# Patient Record
Sex: FEMALE | Race: WHITE | NOT HISPANIC OR LATINO | URBAN - METROPOLITAN AREA
[De-identification: names, ages, dates, MRNs, and addresses within clinical notes are randomized per-mention and may not be internally consistent; named-entity substitution may affect disease eponyms.]

---

## 2022-01-13 ENCOUNTER — EMERGENCY (EMERGENCY)
Facility: HOSPITAL | Age: 37
LOS: 1 days | Discharge: HOME | End: 2022-01-13
Attending: EMERGENCY MEDICINE | Admitting: EMERGENCY MEDICINE
Payer: COMMERCIAL

## 2022-01-13 VITALS
HEART RATE: 90 BPM | DIASTOLIC BLOOD PRESSURE: 65 MMHG | OXYGEN SATURATION: 99 % | HEIGHT: 64 IN | WEIGHT: 158.73 LBS | SYSTOLIC BLOOD PRESSURE: 101 MMHG | RESPIRATION RATE: 16 BRPM | TEMPERATURE: 99 F

## 2022-01-13 VITALS
TEMPERATURE: 98 F | SYSTOLIC BLOOD PRESSURE: 100 MMHG | RESPIRATION RATE: 15 BRPM | DIASTOLIC BLOOD PRESSURE: 68 MMHG | HEART RATE: 85 BPM | OXYGEN SATURATION: 98 %

## 2022-01-13 LAB
ALBUMIN SERPL ELPH-MCNC: 3.2 G/DL — LOW (ref 3.3–5)
ALP SERPL-CCNC: 67 U/L — SIGNIFICANT CHANGE UP (ref 40–120)
ALT FLD-CCNC: 25 U/L — SIGNIFICANT CHANGE UP (ref 12–78)
ANION GAP SERPL CALC-SCNC: 7 MMOL/L — SIGNIFICANT CHANGE UP (ref 5–17)
AST SERPL-CCNC: 22 U/L — SIGNIFICANT CHANGE UP (ref 15–37)
BASOPHILS # BLD AUTO: 0.01 K/UL — SIGNIFICANT CHANGE UP (ref 0–0.2)
BASOPHILS NFR BLD AUTO: 0.2 % — SIGNIFICANT CHANGE UP (ref 0–2)
BILIRUB SERPL-MCNC: 0.4 MG/DL — SIGNIFICANT CHANGE UP (ref 0.2–1.2)
BUN SERPL-MCNC: 4 MG/DL — LOW (ref 7–23)
CALCIUM SERPL-MCNC: 8.8 MG/DL — SIGNIFICANT CHANGE UP (ref 8.5–10.1)
CHLORIDE SERPL-SCNC: 106 MMOL/L — SIGNIFICANT CHANGE UP (ref 96–108)
CO2 SERPL-SCNC: 23 MMOL/L — SIGNIFICANT CHANGE UP (ref 22–31)
CREAT SERPL-MCNC: 0.55 MG/DL — SIGNIFICANT CHANGE UP (ref 0.5–1.3)
EOSINOPHIL # BLD AUTO: 0.04 K/UL — SIGNIFICANT CHANGE UP (ref 0–0.5)
EOSINOPHIL NFR BLD AUTO: 1 % — SIGNIFICANT CHANGE UP (ref 0–6)
GLUCOSE SERPL-MCNC: 116 MG/DL — HIGH (ref 70–99)
HCG SERPL-ACNC: HIGH MIU/ML
HCT VFR BLD CALC: 37.9 % — SIGNIFICANT CHANGE UP (ref 34.5–45)
HGB BLD-MCNC: 12.5 G/DL — SIGNIFICANT CHANGE UP (ref 11.5–15.5)
IMM GRANULOCYTES NFR BLD AUTO: 0.7 % — SIGNIFICANT CHANGE UP (ref 0–1.5)
LIDOCAIN IGE QN: 78 U/L — SIGNIFICANT CHANGE UP (ref 73–393)
LYMPHOCYTES # BLD AUTO: 1.09 K/UL — SIGNIFICANT CHANGE UP (ref 1–3.3)
LYMPHOCYTES # BLD AUTO: 27.2 % — SIGNIFICANT CHANGE UP (ref 13–44)
MCHC RBC-ENTMCNC: 25.5 PG — LOW (ref 27–34)
MCHC RBC-ENTMCNC: 33 GM/DL — SIGNIFICANT CHANGE UP (ref 32–36)
MCV RBC AUTO: 77.3 FL — LOW (ref 80–100)
MONOCYTES # BLD AUTO: 0.5 K/UL — SIGNIFICANT CHANGE UP (ref 0–0.9)
MONOCYTES NFR BLD AUTO: 12.5 % — SIGNIFICANT CHANGE UP (ref 2–14)
NEUTROPHILS # BLD AUTO: 2.34 K/UL — SIGNIFICANT CHANGE UP (ref 1.8–7.4)
NEUTROPHILS NFR BLD AUTO: 58.4 % — SIGNIFICANT CHANGE UP (ref 43–77)
NRBC # BLD: 0 /100 WBCS — SIGNIFICANT CHANGE UP (ref 0–0)
PLATELET # BLD AUTO: 221 K/UL — SIGNIFICANT CHANGE UP (ref 150–400)
POTASSIUM SERPL-MCNC: 3.3 MMOL/L — LOW (ref 3.5–5.3)
POTASSIUM SERPL-SCNC: 3.3 MMOL/L — LOW (ref 3.5–5.3)
PROT SERPL-MCNC: 7.5 G/DL — SIGNIFICANT CHANGE UP (ref 6–8.3)
RBC # BLD: 4.9 M/UL — SIGNIFICANT CHANGE UP (ref 3.8–5.2)
RBC # FLD: 17 % — HIGH (ref 10.3–14.5)
SODIUM SERPL-SCNC: 136 MMOL/L — SIGNIFICANT CHANGE UP (ref 135–145)
WBC # BLD: 4.01 K/UL — SIGNIFICANT CHANGE UP (ref 3.8–10.5)
WBC # FLD AUTO: 4.01 K/UL — SIGNIFICANT CHANGE UP (ref 3.8–10.5)

## 2022-01-13 PROCEDURE — 76705 ECHO EXAM OF ABDOMEN: CPT | Mod: 26,RT

## 2022-01-13 PROCEDURE — 96361 HYDRATE IV INFUSION ADD-ON: CPT

## 2022-01-13 PROCEDURE — 76801 OB US < 14 WKS SINGLE FETUS: CPT

## 2022-01-13 PROCEDURE — 76801 OB US < 14 WKS SINGLE FETUS: CPT | Mod: 26

## 2022-01-13 PROCEDURE — 99285 EMERGENCY DEPT VISIT HI MDM: CPT

## 2022-01-13 PROCEDURE — 80053 COMPREHEN METABOLIC PANEL: CPT

## 2022-01-13 PROCEDURE — 83690 ASSAY OF LIPASE: CPT

## 2022-01-13 PROCEDURE — 76705 ECHO EXAM OF ABDOMEN: CPT

## 2022-01-13 PROCEDURE — 96374 THER/PROPH/DIAG INJ IV PUSH: CPT

## 2022-01-13 PROCEDURE — 85025 COMPLETE CBC W/AUTO DIFF WBC: CPT

## 2022-01-13 PROCEDURE — 36415 COLL VENOUS BLD VENIPUNCTURE: CPT

## 2022-01-13 PROCEDURE — 99284 EMERGENCY DEPT VISIT MOD MDM: CPT | Mod: 25

## 2022-01-13 PROCEDURE — 84702 CHORIONIC GONADOTROPIN TEST: CPT

## 2022-01-13 RX ORDER — SODIUM CHLORIDE 9 MG/ML
1000 INJECTION INTRAMUSCULAR; INTRAVENOUS; SUBCUTANEOUS ONCE
Refills: 0 | Status: COMPLETED | OUTPATIENT
Start: 2022-01-13 | End: 2022-01-13

## 2022-01-13 RX ORDER — PANTOPRAZOLE SODIUM 20 MG/1
1 TABLET, DELAYED RELEASE ORAL
Qty: 14 | Refills: 0
Start: 2022-01-13 | End: 2022-01-26

## 2022-01-13 RX ORDER — PANTOPRAZOLE SODIUM 20 MG/1
40 TABLET, DELAYED RELEASE ORAL ONCE
Refills: 0 | Status: COMPLETED | OUTPATIENT
Start: 2022-01-13 | End: 2022-01-13

## 2022-01-13 RX ADMIN — SODIUM CHLORIDE 1000 MILLILITER(S): 9 INJECTION INTRAMUSCULAR; INTRAVENOUS; SUBCUTANEOUS at 11:10

## 2022-01-13 RX ADMIN — PANTOPRAZOLE SODIUM 40 MILLIGRAM(S): 20 TABLET, DELAYED RELEASE ORAL at 10:09

## 2022-01-13 RX ADMIN — SODIUM CHLORIDE 1000 MILLILITER(S): 9 INJECTION INTRAMUSCULAR; INTRAVENOUS; SUBCUTANEOUS at 10:09

## 2022-01-13 NOTE — ED PROVIDER NOTE - NSFOLLOWUPINSTRUCTIONS_ED_ALL_ED_FT
1) Follow-up with your Primary Medical Doctor. Call today for prompt follow-up.  2) Return to Emergency room for any worsening or persistent pain, weakness, fever, vomiting, diarrhea, unable to eat / drink, weak or dizzy, or any other concerning symptoms.  3) See attached instruction sheets for additional information, including information regarding signs and symptoms to look out for, reasons to seek immediate care and other important instructions.  4) Follow-up with gastroenterology - call today to arrange prompt follow-up  5) Protonix once daily for 14 days  6) Follow-up with Dr Trina Sapp , your OBGYN as discussed

## 2022-01-13 NOTE — ED ADULT TRIAGE NOTE - CHIEF COMPLAINT QUOTE
upper abdominal pain for 3 weeks, pt is approx 11 weeks pregnant (LMP 10/31/21), was given pepcid 20mg from PMD with no relief

## 2022-01-13 NOTE — ED PROVIDER NOTE - GASTROINTESTINAL, MLM
Abdomen soft, mild epigastric tenderness, no lower abd tenderness. no guarding. non-distended. no hsm. no cvat

## 2022-01-13 NOTE — ED PROVIDER NOTE - CARE PROVIDER_API CALL
FINESSE COSTA  [facility]  Phone: [phone]  Fax: [phone]  Follow Up Time:     Vickey Henao (DO)  Gastroenterology; Internal Medicine  [address] [place] [address]  Phone: [phone]  Fax: [phone]  Follow Up Time:

## 2022-01-13 NOTE — ED ADULT NURSE NOTE - OBJECTIVE STATEMENT
Presents to ER with epigastric pain for the past couple of weeks.  Is 11 weeks pregnant (LMP 10.31) and was placed on famotidine which she reports no relief.  States pain worsens after meals or when she drinks acidic drinks like orange juice.

## 2022-01-13 NOTE — ED PROVIDER NOTE - PATIENT PORTAL LINK FT
You can access the FollowMyHealth Patient Portal offered by [facility] by registering at the following website: [email or web address]. By joining [employer]’s FollowMyHealth portal, you will also be able to view your health information using other applications (apps) compatible with our system.

## 2022-01-13 NOTE — ED PROVIDER NOTE - OBJECTIVE STATEMENT
37 yo F  10wk 5/7 , edc 8/6 p/w epigastric pain x past few weeks. Pt was seen by PMD and was given pepcid. pt with persistent discomfort, stopped pepcid, some improvement with TUMS. No cp/sob/palp. no cough/ uri. no covid exposures. Pt not vaccinated. no neck / back pain. no numb/ting/focal weak. NO recent illness. No other abd pain. no vb / vag dc. no other acute co or changes.

## 2022-01-13 NOTE — ED PROVIDER NOTE - PROGRESS NOTE DETAILS
Pt doing well, no acute co or changes. Pt with some improvement. Dw pt re PPI and possible risks during preg, answered all questions and is comfortable with taking med. Pt will fu with GI asap for further villareal and eval. All results were explained to patient and a copy of all available results given.

## 2022-03-25 PROBLEM — Z00.00 ENCOUNTER FOR PREVENTIVE HEALTH EXAMINATION: Status: ACTIVE | Noted: 2022-03-25

## 2022-03-30 ENCOUNTER — APPOINTMENT (OUTPATIENT)
Dept: PEDIATRIC CARDIOLOGY | Facility: CLINIC | Age: 37
End: 2022-03-30

## 2022-04-02 ENCOUNTER — EMERGENCY (EMERGENCY)
Facility: HOSPITAL | Age: 37
LOS: 1 days | Discharge: HOME | End: 2022-04-02
Attending: STUDENT IN AN ORGANIZED HEALTH CARE EDUCATION/TRAINING PROGRAM | Admitting: STUDENT IN AN ORGANIZED HEALTH CARE EDUCATION/TRAINING PROGRAM
Payer: COMMERCIAL

## 2022-04-02 VITALS
OXYGEN SATURATION: 100 % | HEART RATE: 120 BPM | HEIGHT: 64 IN | DIASTOLIC BLOOD PRESSURE: 66 MMHG | SYSTOLIC BLOOD PRESSURE: 105 MMHG | TEMPERATURE: 99 F | RESPIRATION RATE: 18 BRPM

## 2022-04-02 VITALS
OXYGEN SATURATION: 100 % | DIASTOLIC BLOOD PRESSURE: 60 MMHG | HEART RATE: 96 BPM | SYSTOLIC BLOOD PRESSURE: 105 MMHG | RESPIRATION RATE: 18 BRPM

## 2022-04-02 LAB
ALBUMIN SERPL ELPH-MCNC: 3.6 G/DL — SIGNIFICANT CHANGE UP (ref 3.3–5)
ALP SERPL-CCNC: 77 U/L — SIGNIFICANT CHANGE UP (ref 40–120)
ALT FLD-CCNC: 17 U/L — SIGNIFICANT CHANGE UP (ref 4–33)
ANION GAP SERPL CALC-SCNC: 12 MMOL/L — SIGNIFICANT CHANGE UP (ref 7–14)
APPEARANCE UR: CLEAR — SIGNIFICANT CHANGE UP
APTT BLD: 28.6 SEC — SIGNIFICANT CHANGE UP (ref 27–36.3)
AST SERPL-CCNC: 22 U/L — SIGNIFICANT CHANGE UP (ref 4–32)
BASOPHILS # BLD AUTO: 0.02 K/UL — SIGNIFICANT CHANGE UP (ref 0–0.2)
BASOPHILS NFR BLD AUTO: 0.3 % — SIGNIFICANT CHANGE UP (ref 0–2)
BILIRUB SERPL-MCNC: <0.2 MG/DL — SIGNIFICANT CHANGE UP (ref 0.2–1.2)
BILIRUB UR-MCNC: NEGATIVE — SIGNIFICANT CHANGE UP
BUN SERPL-MCNC: 4 MG/DL — LOW (ref 7–23)
CALCIUM SERPL-MCNC: 8.8 MG/DL — SIGNIFICANT CHANGE UP (ref 8.4–10.5)
CHLORIDE SERPL-SCNC: 103 MMOL/L — SIGNIFICANT CHANGE UP (ref 98–107)
CO2 SERPL-SCNC: 17 MMOL/L — LOW (ref 22–31)
COLOR SPEC: COLORLESS — SIGNIFICANT CHANGE UP
CREAT SERPL-MCNC: 0.47 MG/DL — LOW (ref 0.5–1.3)
D DIMER BLD IA.RAPID-MCNC: 574 NG/ML DDU — HIGH
DIFF PNL FLD: NEGATIVE — SIGNIFICANT CHANGE UP
EGFR: 126 ML/MIN/1.73M2 — SIGNIFICANT CHANGE UP
EOSINOPHIL # BLD AUTO: 0.09 K/UL — SIGNIFICANT CHANGE UP (ref 0–0.5)
EOSINOPHIL NFR BLD AUTO: 1.3 % — SIGNIFICANT CHANGE UP (ref 0–6)
FLUAV AG NPH QL: SIGNIFICANT CHANGE UP
FLUBV AG NPH QL: SIGNIFICANT CHANGE UP
GLUCOSE SERPL-MCNC: 105 MG/DL — HIGH (ref 70–99)
GLUCOSE UR QL: NEGATIVE — SIGNIFICANT CHANGE UP
HCT VFR BLD CALC: 37.1 % — SIGNIFICANT CHANGE UP (ref 34.5–45)
HGB BLD-MCNC: 12.3 G/DL — SIGNIFICANT CHANGE UP (ref 11.5–15.5)
IANC: 5.08 K/UL — SIGNIFICANT CHANGE UP (ref 1.8–7.4)
IMM GRANULOCYTES NFR BLD AUTO: 1.2 % — SIGNIFICANT CHANGE UP (ref 0–1.5)
INR BLD: 0.95 RATIO — SIGNIFICANT CHANGE UP (ref 0.88–1.16)
KETONES UR-MCNC: NEGATIVE — SIGNIFICANT CHANGE UP
LEUKOCYTE ESTERASE UR-ACNC: NEGATIVE — SIGNIFICANT CHANGE UP
LYMPHOCYTES # BLD AUTO: 0.72 K/UL — LOW (ref 1–3.3)
LYMPHOCYTES # BLD AUTO: 10.8 % — LOW (ref 13–44)
MCHC RBC-ENTMCNC: 27.3 PG — SIGNIFICANT CHANGE UP (ref 27–34)
MCHC RBC-ENTMCNC: 33.2 GM/DL — SIGNIFICANT CHANGE UP (ref 32–36)
MCV RBC AUTO: 82.3 FL — SIGNIFICANT CHANGE UP (ref 80–100)
MONOCYTES # BLD AUTO: 0.68 K/UL — SIGNIFICANT CHANGE UP (ref 0–0.9)
MONOCYTES NFR BLD AUTO: 10.2 % — SIGNIFICANT CHANGE UP (ref 2–14)
NEUTROPHILS # BLD AUTO: 5.08 K/UL — SIGNIFICANT CHANGE UP (ref 1.8–7.4)
NEUTROPHILS NFR BLD AUTO: 76.2 % — SIGNIFICANT CHANGE UP (ref 43–77)
NITRITE UR-MCNC: NEGATIVE — SIGNIFICANT CHANGE UP
NRBC # BLD: 0 /100 WBCS — SIGNIFICANT CHANGE UP
NRBC # FLD: 0 K/UL — SIGNIFICANT CHANGE UP
NT-PROBNP SERPL-SCNC: 62 PG/ML — SIGNIFICANT CHANGE UP
PH UR: 6.5 — SIGNIFICANT CHANGE UP (ref 5–8)
PLATELET # BLD AUTO: 203 K/UL — SIGNIFICANT CHANGE UP (ref 150–400)
POTASSIUM SERPL-MCNC: 3.7 MMOL/L — SIGNIFICANT CHANGE UP (ref 3.5–5.3)
POTASSIUM SERPL-SCNC: 3.7 MMOL/L — SIGNIFICANT CHANGE UP (ref 3.5–5.3)
PROT SERPL-MCNC: 7 G/DL — SIGNIFICANT CHANGE UP (ref 6–8.3)
PROT UR-MCNC: NEGATIVE — SIGNIFICANT CHANGE UP
PROTHROM AB SERPL-ACNC: 11 SEC — SIGNIFICANT CHANGE UP (ref 10.5–13.4)
RBC # BLD: 4.51 M/UL — SIGNIFICANT CHANGE UP (ref 3.8–5.2)
RBC # FLD: 15.2 % — HIGH (ref 10.3–14.5)
RSV RNA NPH QL NAA+NON-PROBE: SIGNIFICANT CHANGE UP
SARS-COV-2 RNA SPEC QL NAA+PROBE: DETECTED
SODIUM SERPL-SCNC: 132 MMOL/L — LOW (ref 135–145)
SP GR SPEC: 1 — SIGNIFICANT CHANGE UP (ref 1–1.05)
TROPONIN T, HIGH SENSITIVITY RESULT: <6 NG/L — SIGNIFICANT CHANGE UP
UROBILINOGEN FLD QL: SIGNIFICANT CHANGE UP
WBC # BLD: 6.67 K/UL — SIGNIFICANT CHANGE UP (ref 3.8–10.5)
WBC # FLD AUTO: 6.67 K/UL — SIGNIFICANT CHANGE UP (ref 3.8–10.5)

## 2022-04-02 PROCEDURE — 71045 X-RAY EXAM CHEST 1 VIEW: CPT | Mod: 26

## 2022-04-02 PROCEDURE — 99285 EMERGENCY DEPT VISIT HI MDM: CPT | Mod: 25

## 2022-04-02 PROCEDURE — 93010 ELECTROCARDIOGRAM REPORT: CPT

## 2022-04-02 PROCEDURE — 93970 EXTREMITY STUDY: CPT | Mod: 26

## 2022-04-02 RX ORDER — ACETAMINOPHEN 500 MG
2 TABLET ORAL
Qty: 84 | Refills: 0
Start: 2022-04-02 | End: 2022-04-08

## 2022-04-02 RX ORDER — PANTOPRAZOLE SODIUM 20 MG/1
1 TABLET, DELAYED RELEASE ORAL
Qty: 14 | Refills: 0
Start: 2022-04-02 | End: 2022-04-15

## 2022-04-02 RX ORDER — SODIUM CHLORIDE 9 MG/ML
1000 INJECTION INTRAMUSCULAR; INTRAVENOUS; SUBCUTANEOUS ONCE
Refills: 0 | Status: COMPLETED | OUTPATIENT
Start: 2022-04-02 | End: 2022-04-02

## 2022-04-02 RX ORDER — FAMOTIDINE 10 MG/ML
20 INJECTION INTRAVENOUS ONCE
Refills: 0 | Status: COMPLETED | OUTPATIENT
Start: 2022-04-02 | End: 2022-04-02

## 2022-04-02 RX ADMIN — Medication 30 MILLILITER(S): at 07:50

## 2022-04-02 RX ADMIN — SODIUM CHLORIDE 1000 MILLILITER(S): 9 INJECTION INTRAMUSCULAR; INTRAVENOUS; SUBCUTANEOUS at 07:50

## 2022-04-02 RX ADMIN — FAMOTIDINE 20 MILLIGRAM(S): 10 INJECTION INTRAVENOUS at 07:50

## 2022-04-02 NOTE — ED PROVIDER NOTE - PATIENT PORTAL LINK FT
You can access the FollowMyHealth Patient Portal offered by [facility] by registering at the following website: [email or web address]. By joining [facility]’s FollowMyHealth portal, you will also be able to view your health information using other applications (apps) compatible with our system.

## 2022-04-02 NOTE — ED ADULT NURSE REASSESSMENT NOTE - NS ED NURSE REASSESS COMMENT FT1
pt remains alert,oriented x3. returns from  ultrasound, awaiting ua ,us results. skin warm,dry. states decreased ch pains . will continue to monitor

## 2022-04-02 NOTE — ED ADULT TRIAGE NOTE - CHIEF COMPLAINT QUOTE
Pt 22 wks pregnant, c/o SOB and chest discomfort 1x day and nausea, dizziness 1x day. Denies abd pain or vaginal discharge/bleeding

## 2022-04-02 NOTE — ED PROVIDER NOTE - PHYSICAL EXAMINATION
Physical Exam:  Gen: NAD, AOx3, non-toxic appearing, able to ambulate without assistance  Head: NCAT  HEENT: EOMI, PEERLA, normal conjunctiva, tongue midline, oral mucosa moist, normal oropharynx  Lung: CTAB, no respiratory distress, no wheezes/rhonchi/rales B/L, speaking in full sentences  CV: RRR, no murmurs, rubs or gallops, distal pulses 2+ b/l  Abd: soft, NT, ND, no guarding, no rigidity, no rebound tenderness, no CVA tenderness,   MSK: no chest wall TTP  Skin: Warm, well perfused, no rash, no leg swelling  Psych: normal affect, calm

## 2022-04-02 NOTE — ED ADULT NURSE NOTE - OBJECTIVE STATEMENT
pt presents to ED room 17, 22 weeks pregnant coomin gin complaining of SOB worsening on exertion and intermittent midsternal non radiating chest pain x1 day. Patient sates chest pain is worsening with eating. Respirations even and unlabored. Lung sounds clear with equal chest rise bilaterally. ABD is soft, non tender, non distended with normal active bowel sounds. skin warm dry and intact. No complaints of headache, nausea, dizziness, vomiting, diarrhea, fever, chills, dysuria, hematuria, vaginal discharge/bleeding verbalized. NSR on cardiac monitor, 18LAC placed, medicated as ordered.

## 2022-04-02 NOTE — ED PROVIDER NOTE - CROS ED ROS STATEMENT
all other ROS negative except as per HPI

## 2022-04-02 NOTE — ED PROVIDER NOTE - PROGRESS NOTE DETAILS
Ingram: patient feeling better after gi cocktail Harrison PGY3: patient feeling much better. labs reviewed, very low risk of PE per YEARS, no need for further workup. Likely GERD. If US negative will dc. Harrison PGY3:  Reviewed and discussed results with patient. Discussed importance of follow up and return precautions. Patient agrees with plan.  ID Harrison PGY3:  Reviewed and discussed results with patient. Discussed importance of follow up and return precautions. Patient agrees with plan.  ID [id number]. Provided with gastroenterology referral list

## 2022-04-02 NOTE — ED ADULT NURSE NOTE - NSIMPLEMENTINTERV_GEN_ALL_ED
Implemented All Universal Safety Interventions:  [place] to call system. Call bell, personal items and telephone within reach. Instruct patient to call for assistance. Room bathroom lighting operational. Non-slip footwear when patient is off stretcher. Physically safe environment: no spills, clutter or unnecessary equipment. Stretcher in lowest position, wheels locked, appropriate side rails in place.

## 2022-04-02 NOTE — ED PROVIDER NOTE - CARE PLAN
1 Principal Discharge DX:	GERD (gastroesophageal reflux disease)   Principal Discharge DX:	GERD (gastroesophageal reflux disease)  Secondary Diagnosis:	COVID-19

## 2022-04-02 NOTE — ED PROVIDER NOTE - ATTENDING CONTRIBUTION TO CARE
Setswana : [id number]   36F w no reported medical history, 22 weeks pregnant p/w 1 day of burning mid sternal chest pain, worse w/ eating, associated w/ sore throat and sob. States she had similar pain in the past and was started on gerd medication by her obgyn w/o relief. Reports vomiting x2 after drinking mild tis morning. Denies lower extremity swelling, h/o clots, abdominal pain, vaginal bleeding , loss of fluids, dysuria, or discharge. Denies pleuritic component    Except as documented in the HPI,  all other systems are negative    CONSTITUTIONAL: NAD, awake, alert  HEAD: Normocephalic; atraumatic  EYES: EOMI, no nystagmus  ENMT: External appears normal, MMM  NECK: no tenderness, FROM  CARD: Normal Sl, S2; no audible murmurs  RESP: normal wob, lungs ctab  ABD: soft, non-distended; non-tender, gravid  MSK: no edema, normal ROM in all four extremities  SKIN: Warm, dry, no rashes  NEURO: aaox3, moving all extremities spontaneously    36F w no reported medical history, 22 weeks pregnant p/w 1 day of burning mid sternal chest pain, worse w/ eating, associated w/ sore throat and sob, no pedal edema, no calf tenderness, HR improved from triage in room, will check years, less likely to be PE given symptoms, us r/o dvt, labs, beta, dimer, trop, bnp, meds, reassess, no gyn complaints, FHR reassuring, low heart score, no radiation or neuro deficits

## 2022-04-02 NOTE — ED PROVIDER NOTE - NSFOLLOWUPINSTRUCTIONS_ED_ALL_ED_FT
- Continue all regular medications  - For pain/fever, take tylenol as directed on the packaging  - Quarantine in your home until 4/5/22  - you may take maalox or sucrafate for your reflux  - Avoid fatty foods, mint, chocolate and eating meals close to bedtime  - Follow up with your primary doctor or OBGYN in 1 week  - You were given copies of labs and/or imaging results if applicable, please take them to your follow up appointments  - Return to the ER for constant vomiting, vaginal bleeding or any worsening symptoms or concerns

## 2022-04-02 NOTE — ED PROVIDER NOTE - OBJECTIVE STATEMENT
35yo female  22 weeks pregnant p/w 1 day intermittent burning mid-sternal chest pain worse with eating a/w sore throat and dyspnea. Had similar pain and was started on a PPI without relief. Vomited x 2 after eating this AM. Denies abd pain, fever, h/o PE/dvt, leg swelling.

## 2022-04-02 NOTE — ED ADULT NURSE REASSESSMENT NOTE - NS ED NURSE REASSESS COMMENT FT1
pt alert,orineted x3 skin warm,dry. denies vag bleeding,spotting,abd pains. states pain to chest less at present. denies diff breathing. no swelling noted. md aware of bp. n/s infusing. will continue to monitor

## 2022-04-02 NOTE — ED ADULT NURSE REASSESSMENT NOTE - NS ED NURSE REASSESS COMMENT FT1
pt remains alert,oriented x3.  denies ch pains,diff breathing. pulse ox 100 on r/a while ambulating.  iv removed by md. pt states had received  covid vaccination. instructions given by md

## 2022-04-03 LAB
CULTURE RESULTS: SIGNIFICANT CHANGE UP
SPECIMEN SOURCE: SIGNIFICANT CHANGE UP

## 2022-04-04 ENCOUNTER — TRANSCRIPTION ENCOUNTER (OUTPATIENT)
Age: 37
End: 2022-04-04

## 2022-04-06 ENCOUNTER — APPOINTMENT (OUTPATIENT)
Dept: PEDIATRIC CARDIOLOGY | Facility: CLINIC | Age: 37
End: 2022-04-06
Payer: COMMERCIAL

## 2022-04-06 PROCEDURE — 93325 DOPPLER ECHO COLOR FLOW MAPG: CPT | Mod: 59

## 2022-04-06 PROCEDURE — 76821 MIDDLE CEREBRAL ARTERY ECHO: CPT

## 2022-04-06 PROCEDURE — 99202 OFFICE O/P NEW SF 15 MIN: CPT | Mod: 25

## 2022-04-06 PROCEDURE — 76820 UMBILICAL ARTERY ECHO: CPT

## 2022-04-06 PROCEDURE — 76827 ECHO EXAM OF FETAL HEART: CPT

## 2022-04-06 PROCEDURE — 76825 ECHO EXAM OF FETAL HEART: CPT

## 2022-04-23 ENCOUNTER — TRANSCRIPTION ENCOUNTER (OUTPATIENT)
Age: 37
End: 2022-04-23

## 2022-06-28 ENCOUNTER — EMERGENCY (EMERGENCY)
Facility: HOSPITAL | Age: 37
LOS: 1 days | Discharge: ANOTHER HOSPITAL | End: 2022-06-28
Attending: EMERGENCY MEDICINE | Admitting: EMERGENCY MEDICINE
Payer: COMMERCIAL

## 2022-06-28 ENCOUNTER — OUTPATIENT (OUTPATIENT)
Dept: INPATIENT UNIT | Facility: HOSPITAL | Age: 37
LOS: 1 days | Discharge: HOME | End: 2022-06-28
Payer: COMMERCIAL

## 2022-06-28 VITALS
SYSTOLIC BLOOD PRESSURE: 144 MMHG | HEIGHT: 64 IN | WEIGHT: 182.1 LBS | OXYGEN SATURATION: 100 % | TEMPERATURE: 98 F | DIASTOLIC BLOOD PRESSURE: 83 MMHG | HEART RATE: 76 BPM | RESPIRATION RATE: 18 BRPM

## 2022-06-28 VITALS
HEART RATE: 105 BPM | OXYGEN SATURATION: 100 % | DIASTOLIC BLOOD PRESSURE: 75 MMHG | SYSTOLIC BLOOD PRESSURE: 122 MMHG | TEMPERATURE: 98 F | RESPIRATION RATE: 18 BRPM

## 2022-06-28 DIAGNOSIS — O36.8121: ICD-10-CM

## 2022-06-28 LAB
ALBUMIN SERPL ELPH-MCNC: 2.8 G/DL — LOW (ref 3.3–5)
ALP SERPL-CCNC: 96 U/L — SIGNIFICANT CHANGE UP (ref 40–120)
ALT FLD-CCNC: 20 U/L — SIGNIFICANT CHANGE UP (ref 12–78)
ANION GAP SERPL CALC-SCNC: 12 MMOL/L — SIGNIFICANT CHANGE UP (ref 5–17)
APPEARANCE UR: CLEAR — SIGNIFICANT CHANGE UP
AST SERPL-CCNC: 18 U/L — SIGNIFICANT CHANGE UP (ref 15–37)
BASOPHILS # BLD AUTO: 0.02 K/UL — SIGNIFICANT CHANGE UP (ref 0–0.2)
BASOPHILS NFR BLD AUTO: 0.4 % — SIGNIFICANT CHANGE UP (ref 0–2)
BILIRUB SERPL-MCNC: 0.3 MG/DL — SIGNIFICANT CHANGE UP (ref 0.2–1.2)
BILIRUB UR-MCNC: NEGATIVE — SIGNIFICANT CHANGE UP
BUN SERPL-MCNC: 4 MG/DL — LOW (ref 7–23)
CALCIUM SERPL-MCNC: 9.2 MG/DL — SIGNIFICANT CHANGE UP (ref 8.5–10.1)
CHLORIDE SERPL-SCNC: 107 MMOL/L — SIGNIFICANT CHANGE UP (ref 96–108)
CO2 SERPL-SCNC: 21 MMOL/L — LOW (ref 22–31)
COLOR SPEC: YELLOW — SIGNIFICANT CHANGE UP
CREAT SERPL-MCNC: 0.53 MG/DL — SIGNIFICANT CHANGE UP (ref 0.5–1.3)
DIFF PNL FLD: NEGATIVE — SIGNIFICANT CHANGE UP
EGFR: 122 ML/MIN/1.73M2 — SIGNIFICANT CHANGE UP
EOSINOPHIL # BLD AUTO: 0.08 K/UL — SIGNIFICANT CHANGE UP (ref 0–0.5)
EOSINOPHIL NFR BLD AUTO: 1.4 % — SIGNIFICANT CHANGE UP (ref 0–6)
GLUCOSE SERPL-MCNC: 125 MG/DL — HIGH (ref 70–99)
GLUCOSE UR QL: NEGATIVE — SIGNIFICANT CHANGE UP
HCG SERPL-ACNC: HIGH MIU/ML
HCT VFR BLD CALC: 38.6 % — SIGNIFICANT CHANGE UP (ref 34.5–45)
HGB BLD-MCNC: 12.5 G/DL — SIGNIFICANT CHANGE UP (ref 11.5–15.5)
IMM GRANULOCYTES NFR BLD AUTO: 1.4 % — SIGNIFICANT CHANGE UP (ref 0–1.5)
KETONES UR-MCNC: NEGATIVE — SIGNIFICANT CHANGE UP
LEUKOCYTE ESTERASE UR-ACNC: NEGATIVE — SIGNIFICANT CHANGE UP
LYMPHOCYTES # BLD AUTO: 1.11 K/UL — SIGNIFICANT CHANGE UP (ref 1–3.3)
LYMPHOCYTES # BLD AUTO: 19.6 % — SIGNIFICANT CHANGE UP (ref 13–44)
MCHC RBC-ENTMCNC: 27.1 PG — SIGNIFICANT CHANGE UP (ref 27–34)
MCHC RBC-ENTMCNC: 32.4 GM/DL — SIGNIFICANT CHANGE UP (ref 32–36)
MCV RBC AUTO: 83.7 FL — SIGNIFICANT CHANGE UP (ref 80–100)
MONOCYTES # BLD AUTO: 0.48 K/UL — SIGNIFICANT CHANGE UP (ref 0–0.9)
MONOCYTES NFR BLD AUTO: 8.5 % — SIGNIFICANT CHANGE UP (ref 2–14)
NEUTROPHILS # BLD AUTO: 3.9 K/UL — SIGNIFICANT CHANGE UP (ref 1.8–7.4)
NEUTROPHILS NFR BLD AUTO: 68.7 % — SIGNIFICANT CHANGE UP (ref 43–77)
NITRITE UR-MCNC: NEGATIVE — SIGNIFICANT CHANGE UP
NRBC # BLD: 0 /100 WBCS — SIGNIFICANT CHANGE UP (ref 0–0)
PH UR: 6 — SIGNIFICANT CHANGE UP (ref 5–8)
PLATELET # BLD AUTO: 211 K/UL — SIGNIFICANT CHANGE UP (ref 150–400)
POTASSIUM SERPL-MCNC: 3.2 MMOL/L — LOW (ref 3.5–5.3)
POTASSIUM SERPL-SCNC: 3.2 MMOL/L — LOW (ref 3.5–5.3)
PROT SERPL-MCNC: 7.2 G/DL — SIGNIFICANT CHANGE UP (ref 6–8.3)
PROT UR-MCNC: NEGATIVE — SIGNIFICANT CHANGE UP
RBC # BLD: 4.61 M/UL — SIGNIFICANT CHANGE UP (ref 3.8–5.2)
RBC # FLD: 13.9 % — SIGNIFICANT CHANGE UP (ref 10.3–14.5)
SARS-COV-2 RNA SPEC QL NAA+PROBE: SIGNIFICANT CHANGE UP
SODIUM SERPL-SCNC: 140 MMOL/L — SIGNIFICANT CHANGE UP (ref 135–145)
SP GR SPEC: 1.01 — SIGNIFICANT CHANGE UP (ref 1.01–1.02)
UROBILINOGEN FLD QL: NEGATIVE — SIGNIFICANT CHANGE UP
WBC # BLD: 5.67 K/UL — SIGNIFICANT CHANGE UP (ref 3.8–10.5)
WBC # FLD AUTO: 5.67 K/UL — SIGNIFICANT CHANGE UP (ref 3.8–10.5)

## 2022-06-28 PROCEDURE — 85025 COMPLETE CBC W/AUTO DIFF WBC: CPT

## 2022-06-28 PROCEDURE — 76819 FETAL BIOPHYS PROFIL W/O NST: CPT

## 2022-06-28 PROCEDURE — 86900 BLOOD TYPING SEROLOGIC ABO: CPT

## 2022-06-28 PROCEDURE — U0003: CPT

## 2022-06-28 PROCEDURE — 87086 URINE CULTURE/COLONY COUNT: CPT

## 2022-06-28 PROCEDURE — 76815 OB US LIMITED FETUS(S): CPT | Mod: 26

## 2022-06-28 PROCEDURE — 99285 EMERGENCY DEPT VISIT HI MDM: CPT

## 2022-06-28 PROCEDURE — 84702 CHORIONIC GONADOTROPIN TEST: CPT

## 2022-06-28 PROCEDURE — 86901 BLOOD TYPING SEROLOGIC RH(D): CPT

## 2022-06-28 PROCEDURE — 99285 EMERGENCY DEPT VISIT HI MDM: CPT | Mod: 25

## 2022-06-28 PROCEDURE — 59025 FETAL NON-STRESS TEST: CPT

## 2022-06-28 PROCEDURE — 36415 COLL VENOUS BLD VENIPUNCTURE: CPT

## 2022-06-28 PROCEDURE — U0005: CPT

## 2022-06-28 PROCEDURE — 86850 RBC ANTIBODY SCREEN: CPT

## 2022-06-28 PROCEDURE — G0463: CPT

## 2022-06-28 PROCEDURE — 76819 FETAL BIOPHYS PROFIL W/O NST: CPT | Mod: 26

## 2022-06-28 PROCEDURE — 96360 HYDRATION IV INFUSION INIT: CPT

## 2022-06-28 PROCEDURE — 81003 URINALYSIS AUTO W/O SCOPE: CPT

## 2022-06-28 PROCEDURE — 80053 COMPREHEN METABOLIC PANEL: CPT

## 2022-06-28 PROCEDURE — 76815 OB US LIMITED FETUS(S): CPT

## 2022-06-28 RX ORDER — SODIUM CHLORIDE 9 MG/ML
1000 INJECTION, SOLUTION INTRAVENOUS ONCE
Refills: 0 | Status: COMPLETED | OUTPATIENT
Start: 2022-06-28 | End: 2022-06-28

## 2022-06-28 RX ADMIN — SODIUM CHLORIDE 1000 MILLILITER(S): 9 INJECTION, SOLUTION INTRAVENOUS at 19:00

## 2022-06-28 NOTE — ED PROVIDER NOTE - OBJECTIVE STATEMENT
36 yo F PMHx hypothyroidism 35 wks gestation LMP 10/30/21 YONY 8/6/22 presents to ED c/o decreased fetal movement x this afternoon. Pt reports nml check up with her OB yesterday. Scheduled to deliver at [facility]. Pt with no other acute complaint. Denies abd pain/cramping, vaginal bleeding, N/V.  [facility] 36 yo F  (3 prior C'sections) PMHx hypothyroidism 35 wks gestation LMP 10/30/21 YONY 22 presents to ED c/o decreased fetal movement x this afternoon. Pt reports nml check up with her OB yesterday. Scheduled to deliver at [facility]. Pt with no other acute complaint. Denies abd pain/cramping, vaginal bleeding, N/V.  [facility]

## 2022-06-28 NOTE — ED ADULT NURSE NOTE - OBJECTIVE STATEMENT
Pt presented to ED c/o not feeling any fetal movement x few hours.  Pt is 35 weeks pregnant, , 3 c-sections.  Denies any pain or discomfort.  No chest pain or SOB.  No n/v/d.

## 2022-06-28 NOTE — ED ADULT TRIAGE NOTE - CHIEF COMPLAINT QUOTE
Patient is a 38yo female 35 week pregnant states that she has not felt her baby move today. Patient is hypertensive in triage. 144/83 Patient states that she had a appointment yesterday with OBGYN and everything was fine. Patient denies discharge or bleeding vaginally or abdominal pain or trauma

## 2022-06-28 NOTE — ED PROVIDER NOTE - NS ED ATTENDING STATEMENT MOD
This was a shared visit with the DAMIÁN. I reviewed and verified the documentation and independently performed the documented:

## 2022-06-28 NOTE — ED PROVIDER NOTE - CLINICAL SUMMARY MEDICAL DECISION MAKING FREE TEXT BOX
38 yo F  at 35 weeks , EDC 8/6 p/w was in usual state of health, felt baby normally yesterday but states has not felt baby move all day. no fever/chills. no abd pain. no cp/sob/palp. no dysuria/ hematuria. no fall / recent trauma. Pt sp covid April with no known issues after. Pt prev vaccinated. NO other acute co,.  Pt due to have baby at [facility].  Exam: MM Moist. neck supple. no meningeal signs. abd soft NT, gravid abd. no hsm. no cvat. no cvat. No c/c/e. no other acute findings  Check labs, US, XFER to OB center

## 2022-06-29 LAB
CULTURE RESULTS: SIGNIFICANT CHANGE UP
SPECIMEN SOURCE: SIGNIFICANT CHANGE UP

## 2022-07-24 ENCOUNTER — INPATIENT (INPATIENT)
Facility: HOSPITAL | Age: 37
LOS: 2 days | Discharge: HOME | End: 2022-07-27
Attending: OBSTETRICS & GYNECOLOGY | Admitting: OBSTETRICS & GYNECOLOGY
Payer: COMMERCIAL

## 2022-07-24 VITALS — WEIGHT: 182.98 LBS | HEIGHT: 65 IN

## 2022-07-24 DIAGNOSIS — Z3A.38 38 WEEKS GESTATION OF PREGNANCY: ICD-10-CM

## 2022-07-24 DIAGNOSIS — O34.211 MATERNAL CARE FOR LOW TRANSVERSE SCAR FROM PREVIOUS CESAREAN DELIVERY: ICD-10-CM

## 2022-07-24 DIAGNOSIS — Z79.899 OTHER LONG TERM (CURRENT) DRUG THERAPY: ICD-10-CM

## 2022-07-24 DIAGNOSIS — E03.9 HYPOTHYROIDISM, UNSPECIFIED: ICD-10-CM

## 2022-07-24 DIAGNOSIS — O26.899 OTHER SPECIFIED PREGNANCY RELATED CONDITIONS, UNSPECIFIED TRIMESTER: ICD-10-CM

## 2022-07-24 LAB
HCT VFR BLD CALC: 41.1 % — SIGNIFICANT CHANGE UP (ref 34.5–45)
HGB BLD-MCNC: 13.3 G/DL — SIGNIFICANT CHANGE UP (ref 11.5–15.5)
MCHC RBC-ENTMCNC: 27.3 PG — SIGNIFICANT CHANGE UP (ref 27–34)
MCHC RBC-ENTMCNC: 32.4 GM/DL — SIGNIFICANT CHANGE UP (ref 32–36)
MCV RBC AUTO: 84.4 FL — SIGNIFICANT CHANGE UP (ref 80–100)
PLATELET # BLD AUTO: 210 K/UL — SIGNIFICANT CHANGE UP (ref 150–400)
RAPID RVP RESULT: SIGNIFICANT CHANGE UP
RBC # BLD: 4.87 M/UL — SIGNIFICANT CHANGE UP (ref 3.8–5.2)
RBC # FLD: 13.9 % — SIGNIFICANT CHANGE UP (ref 10.3–14.5)
SARS-COV-2 RNA SPEC QL NAA+PROBE: SIGNIFICANT CHANGE UP
T PALLIDUM AB TITR SER: NEGATIVE — SIGNIFICANT CHANGE UP
WBC # BLD: 6.28 K/UL — SIGNIFICANT CHANGE UP (ref 3.8–10.5)
WBC # FLD AUTO: 6.28 K/UL — SIGNIFICANT CHANGE UP (ref 3.8–10.5)

## 2022-07-24 PROCEDURE — 99214 OFFICE O/P EST MOD 30 MIN: CPT

## 2022-07-24 PROCEDURE — 86850 RBC ANTIBODY SCREEN: CPT

## 2022-07-24 PROCEDURE — 86780 TREPONEMA PALLIDUM: CPT

## 2022-07-24 PROCEDURE — 87635 SARS-COV-2 COVID-19 AMP PRB: CPT

## 2022-07-24 PROCEDURE — 0225U NFCT DS DNA&RNA 21 SARSCOV2: CPT

## 2022-07-24 PROCEDURE — 86901 BLOOD TYPING SEROLOGIC RH(D): CPT

## 2022-07-24 PROCEDURE — 85025 COMPLETE CBC W/AUTO DIFF WBC: CPT

## 2022-07-24 PROCEDURE — 86900 BLOOD TYPING SEROLOGIC ABO: CPT

## 2022-07-24 PROCEDURE — 59050 FETAL MONITOR W/REPORT: CPT

## 2022-07-24 PROCEDURE — 36415 COLL VENOUS BLD VENIPUNCTURE: CPT

## 2022-07-24 PROCEDURE — 94760 N-INVAS EAR/PLS OXIMETRY 1: CPT

## 2022-07-24 PROCEDURE — 85027 COMPLETE CBC AUTOMATED: CPT

## 2022-07-24 RX ORDER — NALOXONE HYDROCHLORIDE 4 MG/.1ML
0.1 SPRAY NASAL
Refills: 0 | Status: DISCONTINUED | OUTPATIENT
Start: 2022-07-24 | End: 2022-07-27

## 2022-07-24 RX ORDER — METOCLOPRAMIDE HCL 10 MG
5 TABLET ORAL DAILY
Refills: 0 | Status: DISCONTINUED | OUTPATIENT
Start: 2022-07-24 | End: 2022-07-24

## 2022-07-24 RX ORDER — KETOROLAC TROMETHAMINE 30 MG/ML
30 SYRINGE (ML) INJECTION EVERY 6 HOURS
Refills: 0 | Status: DISCONTINUED | OUTPATIENT
Start: 2022-07-24 | End: 2022-07-25

## 2022-07-24 RX ORDER — ACETAMINOPHEN 500 MG
1000 TABLET ORAL ONCE
Refills: 0 | Status: COMPLETED | OUTPATIENT
Start: 2022-07-24 | End: 2022-07-24

## 2022-07-24 RX ORDER — IBUPROFEN 200 MG
600 TABLET ORAL EVERY 6 HOURS
Refills: 0 | Status: COMPLETED | OUTPATIENT
Start: 2022-07-24 | End: 2023-06-22

## 2022-07-24 RX ORDER — OXYCODONE HYDROCHLORIDE 5 MG/1
5 TABLET ORAL
Refills: 0 | Status: DISCONTINUED | OUTPATIENT
Start: 2022-07-24 | End: 2022-07-27

## 2022-07-24 RX ORDER — LANOLIN
1 OINTMENT (GRAM) TOPICAL EVERY 6 HOURS
Refills: 0 | Status: DISCONTINUED | OUTPATIENT
Start: 2022-07-24 | End: 2022-07-27

## 2022-07-24 RX ORDER — METOCLOPRAMIDE HCL 10 MG
10 TABLET ORAL ONCE
Refills: 0 | Status: COMPLETED | OUTPATIENT
Start: 2022-07-24 | End: 2022-07-24

## 2022-07-24 RX ORDER — ENOXAPARIN SODIUM 100 MG/ML
40 INJECTION SUBCUTANEOUS EVERY 24 HOURS
Refills: 0 | Status: DISCONTINUED | OUTPATIENT
Start: 2022-07-24 | End: 2022-07-27

## 2022-07-24 RX ORDER — FAMOTIDINE 10 MG/ML
20 INJECTION INTRAVENOUS ONCE
Refills: 0 | Status: COMPLETED | OUTPATIENT
Start: 2022-07-24 | End: 2022-07-24

## 2022-07-24 RX ORDER — CEFAZOLIN SODIUM 1 G
2000 VIAL (EA) INJECTION ONCE
Refills: 0 | Status: COMPLETED | OUTPATIENT
Start: 2022-07-24 | End: 2022-07-24

## 2022-07-24 RX ORDER — SODIUM CHLORIDE 9 MG/ML
1000 INJECTION, SOLUTION INTRAVENOUS
Refills: 0 | Status: DISCONTINUED | OUTPATIENT
Start: 2022-07-24 | End: 2022-07-27

## 2022-07-24 RX ORDER — SODIUM CHLORIDE 9 MG/ML
1000 INJECTION, SOLUTION INTRAVENOUS ONCE
Refills: 0 | Status: COMPLETED | OUTPATIENT
Start: 2022-07-24 | End: 2022-07-24

## 2022-07-24 RX ORDER — OXYCODONE HYDROCHLORIDE 5 MG/1
5 TABLET ORAL ONCE
Refills: 0 | Status: DISCONTINUED | OUTPATIENT
Start: 2022-07-24 | End: 2022-07-27

## 2022-07-24 RX ORDER — MORPHINE SULFATE 50 MG/1
0.15 CAPSULE, EXTENDED RELEASE ORAL ONCE
Refills: 0 | Status: DISCONTINUED | OUTPATIENT
Start: 2022-07-24 | End: 2022-07-27

## 2022-07-24 RX ORDER — LEVOTHYROXINE SODIUM 125 MCG
75 TABLET ORAL DAILY
Refills: 0 | Status: DISCONTINUED | OUTPATIENT
Start: 2022-07-24 | End: 2022-07-27

## 2022-07-24 RX ORDER — ACETAMINOPHEN 500 MG
975 TABLET ORAL
Refills: 0 | Status: DISCONTINUED | OUTPATIENT
Start: 2022-07-24 | End: 2022-07-27

## 2022-07-24 RX ORDER — OXYTOCIN 10 UNIT/ML
333.33 VIAL (ML) INJECTION
Qty: 20 | Refills: 0 | Status: DISCONTINUED | OUTPATIENT
Start: 2022-07-24 | End: 2022-07-27

## 2022-07-24 RX ORDER — CITRIC ACID/SODIUM CITRATE 300-500 MG
30 SOLUTION, ORAL ORAL ONCE
Refills: 0 | Status: COMPLETED | OUTPATIENT
Start: 2022-07-24 | End: 2022-07-24

## 2022-07-24 RX ORDER — ONDANSETRON 8 MG/1
4 TABLET, FILM COATED ORAL EVERY 6 HOURS
Refills: 0 | Status: DISCONTINUED | OUTPATIENT
Start: 2022-07-24 | End: 2022-07-27

## 2022-07-24 RX ORDER — TETANUS TOXOID, REDUCED DIPHTHERIA TOXOID AND ACELLULAR PERTUSSIS VACCINE, ADSORBED 5; 2.5; 8; 8; 2.5 [IU]/.5ML; [IU]/.5ML; UG/.5ML; UG/.5ML; UG/.5ML
0.5 SUSPENSION INTRAMUSCULAR ONCE
Refills: 0 | Status: DISCONTINUED | OUTPATIENT
Start: 2022-07-24 | End: 2022-07-27

## 2022-07-24 RX ORDER — SODIUM CHLORIDE 9 MG/ML
1000 INJECTION, SOLUTION INTRAVENOUS
Refills: 0 | Status: DISCONTINUED | OUTPATIENT
Start: 2022-07-24 | End: 2022-07-24

## 2022-07-24 RX ORDER — MAGNESIUM HYDROXIDE 400 MG/1
30 TABLET, CHEWABLE ORAL
Refills: 0 | Status: DISCONTINUED | OUTPATIENT
Start: 2022-07-24 | End: 2022-07-27

## 2022-07-24 RX ORDER — DIPHENHYDRAMINE HCL 50 MG
25 CAPSULE ORAL EVERY 6 HOURS
Refills: 0 | Status: DISCONTINUED | OUTPATIENT
Start: 2022-07-24 | End: 2022-07-27

## 2022-07-24 RX ORDER — HYDROMORPHONE HYDROCHLORIDE 2 MG/ML
1 INJECTION INTRAMUSCULAR; INTRAVENOUS; SUBCUTANEOUS
Refills: 0 | Status: DISCONTINUED | OUTPATIENT
Start: 2022-07-24 | End: 2022-07-27

## 2022-07-24 RX ORDER — TRANEXAMIC ACID 100 MG/ML
1000 INJECTION, SOLUTION INTRAVENOUS ONCE
Refills: 0 | Status: COMPLETED | OUTPATIENT
Start: 2022-07-24 | End: 2022-07-24

## 2022-07-24 RX ORDER — SIMETHICONE 80 MG/1
80 TABLET, CHEWABLE ORAL EVERY 4 HOURS
Refills: 0 | Status: DISCONTINUED | OUTPATIENT
Start: 2022-07-24 | End: 2022-07-27

## 2022-07-24 RX ORDER — OXYCODONE HYDROCHLORIDE 5 MG/1
10 TABLET ORAL
Refills: 0 | Status: DISCONTINUED | OUTPATIENT
Start: 2022-07-24 | End: 2022-07-27

## 2022-07-24 RX ADMIN — TRANEXAMIC ACID 220 MILLIGRAM(S): 100 INJECTION, SOLUTION INTRAVENOUS at 08:04

## 2022-07-24 RX ADMIN — SODIUM CHLORIDE 125 MILLILITER(S): 9 INJECTION, SOLUTION INTRAVENOUS at 08:04

## 2022-07-24 RX ADMIN — FAMOTIDINE 20 MILLIGRAM(S): 10 INJECTION INTRAVENOUS at 08:03

## 2022-07-24 RX ADMIN — Medication 1000 MILLIUNIT(S)/MIN: at 09:50

## 2022-07-24 RX ADMIN — Medication 100 MILLIGRAM(S): at 08:48

## 2022-07-24 RX ADMIN — Medication 400 MILLIGRAM(S): at 10:25

## 2022-07-24 RX ADMIN — ONDANSETRON 4 MILLIGRAM(S): 8 TABLET, FILM COATED ORAL at 11:28

## 2022-07-24 RX ADMIN — SODIUM CHLORIDE 2000 MILLILITER(S): 9 INJECTION, SOLUTION INTRAVENOUS at 06:13

## 2022-07-24 RX ADMIN — Medication 10 MILLIGRAM(S): at 12:30

## 2022-07-24 RX ADMIN — Medication 30 MILLIGRAM(S): at 09:20

## 2022-07-24 RX ADMIN — ENOXAPARIN SODIUM 40 MILLIGRAM(S): 100 INJECTION SUBCUTANEOUS at 21:18

## 2022-07-24 RX ADMIN — Medication 30 MILLILITER(S): at 08:09

## 2022-07-24 RX ADMIN — Medication 30 MILLIGRAM(S): at 19:00

## 2022-07-24 RX ADMIN — Medication 30 MILLIGRAM(S): at 18:00

## 2022-07-24 NOTE — PATIENT PROFILE OB - FALL HARM RISK - UNIVERSAL INTERVENTIONS
Bed in lowest position, wheels locked, appropriate side rails in place/Call bell, personal items and telephone in reach/Instruct patient to call for assistance before getting out of bed or chair/Non-slip footwear when patient is out of bed/[place] to call system/Physically safe environment - no spills, clutter or unnecessary equipment/Purposeful Proactive Rounding/Room/bathroom lighting operational, light cord in reach

## 2022-07-25 LAB
BASOPHILS # BLD AUTO: 0.02 K/UL — SIGNIFICANT CHANGE UP (ref 0–0.2)
BASOPHILS NFR BLD AUTO: 0.2 % — SIGNIFICANT CHANGE UP (ref 0–2)
EOSINOPHIL # BLD AUTO: 0.07 K/UL — SIGNIFICANT CHANGE UP (ref 0–0.5)
EOSINOPHIL NFR BLD AUTO: 0.9 % — SIGNIFICANT CHANGE UP (ref 0–6)
HCT VFR BLD CALC: 33.9 % — LOW (ref 34.5–45)
HGB BLD-MCNC: 11.1 G/DL — LOW (ref 11.5–15.5)
IMM GRANULOCYTES NFR BLD AUTO: 0.5 % — SIGNIFICANT CHANGE UP (ref 0–1.5)
LYMPHOCYTES # BLD AUTO: 0.77 K/UL — LOW (ref 1–3.3)
LYMPHOCYTES # BLD AUTO: 9.5 % — LOW (ref 13–44)
MCHC RBC-ENTMCNC: 28.2 PG — SIGNIFICANT CHANGE UP (ref 27–34)
MCHC RBC-ENTMCNC: 32.7 GM/DL — SIGNIFICANT CHANGE UP (ref 32–36)
MCV RBC AUTO: 86 FL — SIGNIFICANT CHANGE UP (ref 80–100)
MONOCYTES # BLD AUTO: 0.49 K/UL — SIGNIFICANT CHANGE UP (ref 0–0.9)
MONOCYTES NFR BLD AUTO: 6 % — SIGNIFICANT CHANGE UP (ref 2–14)
NEUTROPHILS # BLD AUTO: 6.72 K/UL — SIGNIFICANT CHANGE UP (ref 1.8–7.4)
NEUTROPHILS NFR BLD AUTO: 82.9 % — HIGH (ref 43–77)
PLATELET # BLD AUTO: 170 K/UL — SIGNIFICANT CHANGE UP (ref 150–400)
RBC # BLD: 3.94 M/UL — SIGNIFICANT CHANGE UP (ref 3.8–5.2)
RBC # FLD: 14.3 % — SIGNIFICANT CHANGE UP (ref 10.3–14.5)
WBC # BLD: 8.11 K/UL — SIGNIFICANT CHANGE UP (ref 3.8–10.5)
WBC # FLD AUTO: 8.11 K/UL — SIGNIFICANT CHANGE UP (ref 3.8–10.5)

## 2022-07-25 RX ORDER — IBUPROFEN 200 MG
600 TABLET ORAL EVERY 6 HOURS
Refills: 0 | Status: DISCONTINUED | OUTPATIENT
Start: 2022-07-25 | End: 2022-07-27

## 2022-07-25 RX ADMIN — Medication 75 MICROGRAM(S): at 06:28

## 2022-07-25 RX ADMIN — Medication 30 MILLIGRAM(S): at 06:28

## 2022-07-25 RX ADMIN — Medication 975 MILLIGRAM(S): at 14:39

## 2022-07-25 RX ADMIN — Medication 600 MILLIGRAM(S): at 18:32

## 2022-07-25 RX ADMIN — Medication 975 MILLIGRAM(S): at 21:50

## 2022-07-25 RX ADMIN — Medication 30 MILLIGRAM(S): at 00:48

## 2022-07-25 RX ADMIN — Medication 975 MILLIGRAM(S): at 09:12

## 2022-07-25 RX ADMIN — Medication 30 MILLIGRAM(S): at 06:57

## 2022-07-25 RX ADMIN — Medication 975 MILLIGRAM(S): at 21:17

## 2022-07-25 RX ADMIN — Medication 600 MILLIGRAM(S): at 12:49

## 2022-07-25 RX ADMIN — ENOXAPARIN SODIUM 40 MILLIGRAM(S): 100 INJECTION SUBCUTANEOUS at 21:17

## 2022-07-25 RX ADMIN — Medication 30 MILLIGRAM(S): at 00:18

## 2022-07-25 NOTE — PROGRESS NOTE ADULT - ATTENDING COMMENTS
Patient seen and examined by me. Agree with resident assessment and plan. Dressing clean/dry/intact. Continue postop/postpartum care.

## 2022-07-25 NOTE — PROGRESS NOTE ADULT - ASSESSMENT
A/P:  NOHELIA BRAY is a 37y  now POD#1 s/p repeat  section @38w1d GA, uncomplicated.  -Vital signs stable  -Hgb: 13.3 -> AM labs pending   -Voiding, tolerating PO  -Advance care as tolerated   -VTE ppx: encourage ambulation, SCDs while in bed, daily lovenox  -Continue routine postpartum and postoperative care and education  -healthy infant at bedside  -Dispo: Patient to be discharged when meeting all postpartum and postoperative milestones and pending attending approval.

## 2022-07-26 RX ADMIN — Medication 600 MILLIGRAM(S): at 12:10

## 2022-07-26 RX ADMIN — Medication 600 MILLIGRAM(S): at 00:10

## 2022-07-26 RX ADMIN — Medication 975 MILLIGRAM(S): at 22:00

## 2022-07-26 RX ADMIN — Medication 975 MILLIGRAM(S): at 09:35

## 2022-07-26 RX ADMIN — ENOXAPARIN SODIUM 40 MILLIGRAM(S): 100 INJECTION SUBCUTANEOUS at 21:13

## 2022-07-26 RX ADMIN — Medication 600 MILLIGRAM(S): at 06:48

## 2022-07-26 RX ADMIN — Medication 600 MILLIGRAM(S): at 00:38

## 2022-07-26 RX ADMIN — Medication 600 MILLIGRAM(S): at 06:20

## 2022-07-26 RX ADMIN — Medication 600 MILLIGRAM(S): at 18:04

## 2022-07-26 RX ADMIN — Medication 975 MILLIGRAM(S): at 21:14

## 2022-07-26 RX ADMIN — SIMETHICONE 80 MILLIGRAM(S): 80 TABLET, CHEWABLE ORAL at 00:13

## 2022-07-26 RX ADMIN — MAGNESIUM HYDROXIDE 30 MILLILITER(S): 400 TABLET, CHEWABLE ORAL at 21:58

## 2022-07-26 RX ADMIN — Medication 975 MILLIGRAM(S): at 03:52

## 2022-07-26 RX ADMIN — Medication 975 MILLIGRAM(S): at 03:29

## 2022-07-26 RX ADMIN — Medication 975 MILLIGRAM(S): at 15:03

## 2022-07-26 RX ADMIN — Medication 75 MICROGRAM(S): at 06:20

## 2022-07-26 NOTE — PROGRESS NOTE ADULT - ASSESSMENT
A/P:  NOHELIA BRAY is a 37y  now POD#2 s/p repeat  section @38w1d GA, uncomplicated.  -Vital signs stable  -Hgb: 13.3 -> 11.1 ->AM labs pending   -Voiding, tolerating PO  -Advance care as tolerated   - Ordered milk of magnesia   -VTE ppx: encourage ambulation, SCDs while in bed, daily lovenox  -Continue routine postpartum and postoperative care and education  -healthy infant at bedside  -Dispo: Patient to be discharged when meeting all postpartum and postoperative milestones and pending attending approval. A/P:  NOHELIA BRAY is a 37y  now POD#2 s/p repeat  section @38w1d GA, uncomplicated.  -Vital signs stable  -Hgb: 13.3 -> 11.1 ->AM labs pending   -Voiding, tolerating PO  -Advance care as tolerated   - Ordered Dulcolax for constipation   -VTE ppx: encourage ambulation, SCDs while in bed, daily lovenox  -Continue routine postpartum and postoperative care and education  -healthy infant at bedside  -Dispo: Patient to be discharged when meeting all postpartum and postoperative milestones and pending attending approval.

## 2022-07-27 ENCOUNTER — TRANSCRIPTION ENCOUNTER (OUTPATIENT)
Age: 37
End: 2022-07-27

## 2022-07-27 VITALS
OXYGEN SATURATION: 100 % | RESPIRATION RATE: 18 BRPM | TEMPERATURE: 98 F | HEART RATE: 75 BPM | DIASTOLIC BLOOD PRESSURE: 63 MMHG | SYSTOLIC BLOOD PRESSURE: 117 MMHG

## 2022-07-27 RX ORDER — ACETAMINOPHEN 500 MG
2 TABLET ORAL
Qty: 56 | Refills: 0
Start: 2022-07-27 | End: 2022-08-02

## 2022-07-27 RX ORDER — IBUPROFEN 200 MG
1 TABLET ORAL
Qty: 56 | Refills: 0
Start: 2022-07-27 | End: 2022-08-09

## 2022-07-27 RX ORDER — SENNA PLUS 8.6 MG/1
2 TABLET ORAL ONCE
Refills: 0 | Status: COMPLETED | OUTPATIENT
Start: 2022-07-27 | End: 2022-07-27

## 2022-07-27 RX ADMIN — Medication 975 MILLIGRAM(S): at 09:00

## 2022-07-27 RX ADMIN — Medication 975 MILLIGRAM(S): at 03:06

## 2022-07-27 RX ADMIN — Medication 75 MICROGRAM(S): at 06:03

## 2022-07-27 RX ADMIN — SENNA PLUS 2 TABLET(S): 8.6 TABLET ORAL at 10:52

## 2022-07-27 RX ADMIN — Medication 975 MILLIGRAM(S): at 10:00

## 2022-07-27 RX ADMIN — Medication 975 MILLIGRAM(S): at 04:20

## 2022-07-27 RX ADMIN — Medication 600 MILLIGRAM(S): at 00:16

## 2022-07-27 RX ADMIN — Medication 600 MILLIGRAM(S): at 06:03

## 2022-07-27 RX ADMIN — SIMETHICONE 80 MILLIGRAM(S): 80 TABLET, CHEWABLE ORAL at 09:00

## 2022-07-27 RX ADMIN — Medication 600 MILLIGRAM(S): at 01:00

## 2022-07-27 RX ADMIN — Medication 600 MILLIGRAM(S): at 13:15

## 2022-07-27 RX ADMIN — Medication 600 MILLIGRAM(S): at 12:15

## 2022-07-27 RX ADMIN — MAGNESIUM HYDROXIDE 30 MILLILITER(S): 400 TABLET, CHEWABLE ORAL at 09:00

## 2022-07-27 RX ADMIN — Medication 975 MILLIGRAM(S): at 15:00

## 2022-07-27 RX ADMIN — Medication 1 TABLET(S): at 10:52

## 2022-07-27 NOTE — PROGRESS NOTE ADULT - ASSESSMENT
A/P:  NOHELIA BRAY is a 37y  now POD#2 s/p repeat  section @38w1d GA, uncomplicated.  -Vital signs stable  -Hgb: 13.3 -> 11.1  -Voiding, tolerating PO  -Advance care as tolerated   -VTE ppx: encourage ambulation, SCDs while in bed, daily lovenox  -Continue routine postpartum and postoperative care and education  -healthy infant at bedside  -Dispo: Patient to be discharged when meeting all postpartum and postoperative milestones and pending attending approval.

## 2022-07-27 NOTE — DISCHARGE NOTE OB - PATIENT PORTAL LINK FT
You can access the FollowMyHealth Patient Portal offered by [facility] by registering at the following website: [email or web address]. By joining [employer]’s FollowMyHealth portal, you will also be able to view your health information using other applications (apps) compatible with our system.

## 2022-07-27 NOTE — DISCHARGE NOTE OB - NS MD DC FALL RISK RISK
For information on Fall & Injury Prevention, visit: [email or web address] OR  [email or web address] OR  https://www.cdc.gov/steadi/patient.html

## 2022-07-27 NOTE — PROGRESS NOTE ADULT - SUBJECTIVE AND OBJECTIVE BOX
NOHELIA BRAY is a 37y  now POD#2 s/p repeat  section @38w1d GA, uncomplicated.    S:    No acute events overnight.   The patient has no complaints. States her gas has now improved. Denies BM,   Pain controlled with current treatment regimen.   She is ambulating without difficulty and tolerating PO.   - flatus/-BM/+ voiding   She endorses appropriate lochia, which is decreasing.   She is now breast feeding and bottle feeding.   She denies fevers, chills, nausea and vomiting.   She denies lightheadedness, dizziness, palpitations, chest pain and SOB.     O:    T(C): 36.3 (22 @ 04:45), Max: 36.9 (22 @ 23:45)  HR: 81 (22 @ 04:45) (63 - 86)  BP: 97/54 (22 @ 04:45) (94/50 - 111/74)  RR: 18 (22 @ 04:45) (13 - 22)  SpO2: 99% (22 @ 04:45) (97% - 100%)    Gen: NAD, AOx3  Resp: breathing comfortably on RA  Abdomen:  Soft, appropriately tender  Incision: mepilex removed, clean and dry, no signs of active bleeding   Uterus:  Fundus firm below umbilicus  VE:  Lochia as expected                          13.3   6.28  )-----------( 210      ( 2022 06:00 )             41.1             
NOHELIA BRAY is a 37y  now POD#1 s/p repeat  section @38w1d GA, uncomplicated.    S:    No acute events overnight.   The patient has no complaints.  Pain controlled with current treatment regimen.   She is ambulating without difficulty and tolerating PO.   - flatus/-BM/+ voiding   She endorses appropriate lochia, which is decreasing.   She is breastfeeding and providing formula to baby.  She denies fevers, chills, nausea and vomiting.   She denies lightheadedness, dizziness, palpitations, chest pain and SOB.     O:    T(C): 36.3 (22 @ 04:45), Max: 36.9 (22 @ 23:45)  HR: 81 (22 @ 04:45) (63 - 86)  BP: 97/54 (22 @ 04:45) (94/50 - 111/74)  RR: 18 (22 @ 04:45) (13 - 22)  SpO2: 99% (22 @ 04:45) (97% - 100%)    Gen: NAD, AOx3  Resp: breathing comfortably on RA  Abdomen:  Soft, appropriately tender  Incision: mepilex in place, clean and dry  Uterus:  Fundus firm below umbilicus  VE:  Lochia as expected                          13.3   6.28  )-----------( 210      ( 2022 06:00 )             41.1             
NOHELIA BRAY is a 37y  now POD#2 s/p repeat  section @38w1d GA, uncomplicated.    S:    No acute events overnight.   The patient has no complaints. States she wants something to help with her gas and to help pass a BM. Patient states she feels bloated.   Pain controlled with current treatment regimen.   She is ambulating without difficulty and tolerating PO.   - flatus/-BM/+ voiding   She endorses appropriate lochia, which is decreasing.   She is only bottle feeding patient, patient has minimal breast milk production.   She denies fevers, chills, nausea and vomiting.   She denies lightheadedness, dizziness, palpitations, chest pain and SOB.     O:    T(C): 36.3 (22 @ 04:45), Max: 36.9 (22 @ 23:45)  HR: 81 (22 @ 04:45) (63 - 86)  BP: 97/54 (22 @ 04:45) (94/50 - 111/74)  RR: 18 (22 @ 04:45) (13 - 22)  SpO2: 99% (22 @ 04:45) (97% - 100%)    Gen: NAD, AOx3  Resp: breathing comfortably on RA  Abdomen:  Soft, appropriately tender  Incision: mepilex in place, clean and dry  Uterus:  Fundus firm below umbilicus  VE:  Lochia as expected                          13.3   6.28  )-----------( 210      ( 2022 06:00 )             41.1

## 2022-07-27 NOTE — DISCHARGE NOTE OB - HOSPITAL COURSE
s/p uncomplicated rCS on 7/25/22. Patient transferred to post partum unit, uncomplicated hospital course. At the time of discharge patient was tolerating regular diet PO, ambulating, voiding, and demonstrating bowel function. Pain well controlled with pain medications PRN.

## 2022-07-29 DIAGNOSIS — Z3A.00 WEEKS OF GESTATION OF PREGNANCY NOT SPECIFIED: ICD-10-CM

## 2022-07-29 DIAGNOSIS — O26.899 OTHER SPECIFIED PREGNANCY RELATED CONDITIONS, UNSPECIFIED TRIMESTER: ICD-10-CM

## 2022-09-01 ENCOUNTER — NON-APPOINTMENT (OUTPATIENT)
Age: 37
End: 2022-09-01

## 2022-10-20 ENCOUNTER — NON-APPOINTMENT (OUTPATIENT)
Age: 37
End: 2022-10-20

## 2023-06-22 ENCOUNTER — NON-APPOINTMENT (OUTPATIENT)
Age: 38
End: 2023-06-22

## 2023-11-12 ENCOUNTER — NON-APPOINTMENT (OUTPATIENT)
Age: 38
End: 2023-11-12

## 2023-11-13 ENCOUNTER — EMERGENCY (EMERGENCY)
Facility: HOSPITAL | Age: 38
LOS: 1 days | Discharge: HOME | End: 2023-11-13
Attending: EMERGENCY MEDICINE | Admitting: EMERGENCY MEDICINE
Payer: MEDICAID

## 2023-11-13 VITALS
HEIGHT: 65 IN | DIASTOLIC BLOOD PRESSURE: 73 MMHG | SYSTOLIC BLOOD PRESSURE: 108 MMHG | HEART RATE: 77 BPM | OXYGEN SATURATION: 99 % | RESPIRATION RATE: 16 BRPM | WEIGHT: 141.98 LBS | TEMPERATURE: 98 F

## 2023-11-13 PROCEDURE — 99283 EMERGENCY DEPT VISIT LOW MDM: CPT

## 2023-11-13 RX ORDER — IBUPROFEN 200 MG
600 TABLET ORAL ONCE
Refills: 0 | Status: COMPLETED | OUTPATIENT
Start: 2023-11-13 | End: 2023-11-13

## 2023-11-13 RX ORDER — KETOROLAC TROMETHAMINE 30 MG/ML
30 SYRINGE (ML) INJECTION ONCE
Refills: 0 | Status: DISCONTINUED | OUTPATIENT
Start: 2023-11-13 | End: 2023-11-13

## 2023-11-13 RX ADMIN — Medication 600 MILLIGRAM(S): at 20:58

## 2023-11-13 RX ADMIN — Medication 300 MILLIGRAM(S): at 20:58

## 2023-11-13 NOTE — ED ADULT NURSE NOTE - OBJECTIVE STATEMENT
Pt. received alert and oriented x3 with chief complaint of left sided neck pain w/ swelling for last few days. Pt. denies any difficulty swallowing.

## 2023-11-13 NOTE — ED ADULT TRIAGE NOTE - INTERNATIONAL TRAVEL
Health Maintenance Summary     Topic Due On Due Status Completed On    MAMMOGRAM - BREAST CANCER SCREENING Mar 8, 2020 Not Due Mar 8, 2018    Colorectal Cancer Screening - Colonoscopy Jan 9, 2023 Not Due Jan 9, 2013    Osteoporosis Screening  Completed Dec 10, 2015    Immunization - Pneumococcal Oct 15, 2020 Not Due Sep 12, 2016    Diabetes Eye Exam Nov 9, 1970 Overdue     Glycohemoglobin A1C  (Diabetes Sugar)  Mar 5, 2018 Due On Sep 5, 2017    GFR  (Kidney Function Test)  Nov 16, 2018 Not Due Nov 16, 2017    Immunization - TDAP Pregnancy  Hidden     Diabetes Foot Exam  Sep 12, 2017 Overdue Sep 12, 2016    Medicare Wellness Visit Nov 9, 2017 Overdue     IMMUNIZATION - DTaP/Tdap/Td Oct 23, 2010 Overdue Oct 22, 2010    Immunization-Influenza Sep 1, 2017 Overdue Oct 27, 2016    Hepatitis C Screening  Completed Mar 10, 2015          Patient is due for topics as listed above, she wishes to discuss with provider .             No

## 2023-11-13 NOTE — ED PROVIDER NOTE - PHYSICAL EXAMINATION
Gen: No acute distress, non toxic  HENT: NCAT, +Tender swelling to left parotid region, no overlying erythema or warmth. Mucous membranes moist, Oropharynx without exudates, uvula midline. Pain with opening mouth.   Eyes: pink conjunctivae, EOMI, PERRL  CV: RRR, nl s1/s2.  Resp: CTAB, normal rate and effort  Neuro: A&O x 3, sensorimotor intact without deficits   MSK: No spine or joint tenderness to palpation, Full ROM ext x 4  Skin: No rashes. intact and perfused.

## 2023-11-13 NOTE — ED PROVIDER NOTE - CARE PROVIDER_API CALL
Travis Rodas.  Otolaryngology  [address] [place] [id number]  Phone: [phone]  Fax: [phone]  Follow Up Time:

## 2023-11-13 NOTE — ED PROVIDER NOTE - OBJECTIVE STATEMENT
38-year-old female PMHx hypothyroidism presents to ED C/O unilateral left-sided facial swelling that started this morning.  Patient reports history of similar symptoms affecting either side of face in the past, however never bilateral involvement at the same time. Reports symptoms usually resolve on their own after a few days with use of ibuprofen.  States she went to urgent care today because of pain and was told to come to the ED for imaging.  Denies fever, chills, erythema, jaw pain, difficulty swallowing.

## 2023-11-13 NOTE — ED PROVIDER NOTE - CLINICAL SUMMARY MEDICAL DECISION MAKING FREE TEXT BOX
Patient is a 38-year-old female who has a history of hypothyroid, recurrent intermittent parotid gland swelling.  Presents to the emergency room today with left-sided facial/parotid gland swelling that began this morning that is tender.  No fever or chills.  She denies ill contacts.  Denies any cough coryza runny nose neck stiffness photophobia.  She denies any rash.  She denies any travel or trauma.  She went to urgent care, and was directed to the emergency room for evaluation.  She denies any dental infection.    On evaluation is a well-developed well-nourished female no apparent distress.  HEENT reveals swelling at the angle of the mandible on the left side which is tender.  Parotid duct appears patent on the left side without purulent drainage or swelling.  She is able to open her mouth fully.  There is no evidence of dental caries or infection.  Neck is supple.  She does have mild tenderness to the submandibular lymph nodes on the left side.  Cardiopulmonary exam is unremarkable.  Abdomen is soft and nontender without hepatosplenomegaly or CVA tenderness.  Musculoskeletal exam is normal.  Skin exam is normal.    Plan of care includes antibiotics, lemon drops or other pro salivary activities including gum chewing hydration, cool compresses to the face, follow-up with ENT Dr. Dueñas.  NSAIDs for pain.

## 2023-11-13 NOTE — ED PROVIDER NOTE - PATIENT PORTAL LINK FT
You can access the FollowMyHealth Patient Portal offered by [facility] by registering at the following website: [email or web address]. By joining [employer]’s FollowMyHealth portal, you will also be able to view your health information using other applications (apps) compatible with our system.

## 2023-11-13 NOTE — ED PROVIDER NOTE - ATTENDING APP SHARED VISIT CONTRIBUTION OF CARE
Patient is a 38-year-old female who has a history of hypothyroid, recurrent intermittent parotid gland swelling.  Presents to the emergency room today with left-sided facial/parotid gland swelling that began this morning that is tender.  No fever or chills.  She denies ill contacts.  Denies any cough coryza runny nose neck stiffness photophobia.  She denies any rash.  She denies any travel or trauma.  She went to urgent care, and was directed to the emergency room for evaluation.  She denies any dental infection.    On evaluation is a well-developed well-nourished female no apparent distress.  HEENT reveals swelling at the angle of the mandible on the left side which is tender.  Parotid duct appears patent on the left side without purulent drainage or swelling.  She is able to open her mouth fully.  There is no evidence of dental caries or infection.  Neck is supple.  She does have mild tenderness to the submandibular lymph nodes on the left side.  Cardiopulmonary exam is unremarkable.  Abdomen is soft and nontender without hepatosplenomegaly or CVA tenderness.  Musculoskeletal exam is normal.  Skin exam is normal.    Plan of care includes antibiotics, lemon drops or other pro salivary activities including gum chewing hydration, cool compresses to the face, follow-up with ENT Dr. Rodas.  NSAIDs for pain.This chart was made with dictation software and may contain typographical errors.

## 2023-11-13 NOTE — ED PROVIDER NOTE - NSFOLLOWUPINSTRUCTIONS_ED_ALL_ED_FT
- Return to the ED for any new or worsening symptoms.   - Apply warm compresses to affected area  - May gently massage area multiple times per day  - You may try sucking on sour candy to stimulate saliva production - Return to the ED for any new or worsening symptoms.   -clindamycin 1 capsule 3 times per day  ibuprofen for pain (advil)  - Apply warm compresses to affected area  - May gently massage area multiple times per day  - You may try sucking on sour candy to stimulate saliva production  Parotitis  Outline of the head and upper body showing a parotid gland.  Parotitis means that you have irritation and swelling (inflammation) in one or both of your parotid glands. These glands make saliva. They are found on each side of your face, below and in front of your earlobes. You may or may not have pain with this condition.    What are the causes?  This condition may be caused by:  Infections from germs (bacteria or viruses).  Something blocking the flow of saliva through the parotid glands. This can be a stone, scar tissue, or a tumor.  Diseases that cause your body's defense system (immune system) to attack healthy cells in your salivary glands. These are called autoimmune diseases.  What increases the risk?  Being 50 years old or older.  Not drinking enough fluids (being dehydrated).  Drinking too much alcohol.  Having:  A dry mouth.  Diabetes.  Gout.  A long-term illness.  Not taking good care of your mouth and teeth (poor oral hygiene).  Having had radiation treatments to the head and neck.  Taking certain medicines.  What are the signs or symptoms?  Swelling under and in front of the ear. This may get worse after you eat.  Pain and tenderness over the parotid gland. This may get worse after you eat.  Redness and warmth of the skin over the parotid gland.  Fever or chills.  Pus coming from the ducts inside the mouth.  Dry mouth.  A bad taste in the mouth.  How is this treated?  Treatment depends on the cause. It may include:  Antibiotic medicine for an infection from bacteria.  NSAIDs, such as ibuprofen, to treat pain and swelling.  Drinking more fluids.  Removing a stone or obstruction.  Treating a disease that is causing parotitis.  Surgery to drain an infection, remove a growth, or remove the whole gland.  Treatment may not be needed if the swelling goes away with home care.    Follow these instructions at home:  Medicines    A prescription pill bottle with an example of a pill.  Take over-the-counter and prescription medicines only as told by your doctor.  If you were prescribed an antibiotic medicine, take it as told by your doctor. Do not stop taking it even if you start to feel better.  Managing pain and swelling    If told, put heat on the affected area. Do this as often as told by your doctor. Use the heat source that your doctor recommends, such as a moist heat pack or a heating pad.  Place a towel between your skin and the heat source.  Leave the heat on for 20–30 minutes.  Take off the heat if your skin turns bright red. This is very important. If you cannot feel pain, heat, or cold, you have a greater risk of getting burned.  Gargle with salt water 3–4 times a day or as needed. To make salt water, dissolve ½–1 tsp (3–6 g) of salt in 1 cup (237 mL) of warm water.  Gently rub your parotid glands as told by your doctor.  General instructions    Using a toothbrush to brush the front and back sides of the teeth.  Drink enough fluid to keep your pee (urine) pale yellow.  Keep your mouth clean and moist.  Suck on sour candy. This may help to:  Make your mouth less dry.  Make more saliva.  Take good care of your mouth:  Brush your teeth at least two times a day.  Floss your teeth every day.  See your dentist regularly.  Do not smoke or use any products that contain nicotine or tobacco. If you need help quitting, ask your doctor.  Do not drink alcohol.  Keep all follow-up visits.  Contact a doctor if:  You have a fever or chills.  You have new symptoms.  Your symptoms get worse.  Your symptoms do not get better with treatment.  Get help right away if:  You have trouble breathing or swallowing.  These symptoms may be an emergency. Get help right away. Call your local emergency services (911 in the [place]  Do not wait to see if the symptoms will go away.  Do not drive yourself to the hospital.  Summary  Parotitis means that you have irritation and swelling (inflammation) in one or both of your parotid glands.  Symptoms include pain and swelling under and in front of the ear.  Treatment for parotitis depends on the cause. In some cases, the condition may go away on its own with home care.  You should drink plenty of fluids, take good care of your mouth, and do not use products that contain nicotine or tobacco.  This information is not intended to replace advice given to you by your health care provider. Make sure you discuss any questions you have with your health care provider.

## 2023-11-13 NOTE — ED PROVIDER NOTE - NS ED ROS FT
Gen: denies fever, chills, fatigue, weight loss  Skin: denies rashes, laceration, bruising  HEENT: +L facial swelling. denies visual changes, ear pain, nasal congestion, throat pain  Respiratory: denies SPRAGUE, SOB, cough, wheezing  Cardiovascular: denies chest pain, palpitations, diaphoresis, LE edema  GI: denies abdominal pain, n/v/d  : denies dysuria, frequency, urgency, bowel/bladder incontinence  MSK: denies joint swelling/pain, back pain, neck pain  Neuro: denies headache, dizziness, weakness, numbness

## 2023-11-13 NOTE — ED PROVIDER NOTE - DIFFERENTIAL DIAGNOSIS
including but not limited to parotitis, Sialolithiasis, viral infection, lymphadenopathy Differential Diagnosis

## 2024-04-02 ENCOUNTER — NON-APPOINTMENT (OUTPATIENT)
Age: 39
End: 2024-04-02

## 2025-06-19 NOTE — ED PROVIDER NOTE - RESPIRATORY NEGATIVE STATEMENT, MLM
no chest pain, no cough, and no shortness of breath.
Diet, Regular:   Consistent Carbohydrate {No Snacks} (CSTCHO) (06-19-25 @ 06:21)